# Patient Record
Sex: FEMALE | Race: WHITE | Employment: FULL TIME | URBAN - METROPOLITAN AREA
[De-identification: names, ages, dates, MRNs, and addresses within clinical notes are randomized per-mention and may not be internally consistent; named-entity substitution may affect disease eponyms.]

---

## 2018-09-11 ENCOUNTER — HOSPITAL ENCOUNTER (EMERGENCY)
Facility: CLINIC | Age: 43
Discharge: HOME | End: 2018-09-11
Attending: EMERGENCY MEDICINE | Admitting: EMERGENCY MEDICINE
Payer: COMMERCIAL

## 2018-09-11 VITALS
RESPIRATION RATE: 16 BRPM | DIASTOLIC BLOOD PRESSURE: 78 MMHG | TEMPERATURE: 97.5 F | HEART RATE: 98 BPM | SYSTOLIC BLOOD PRESSURE: 127 MMHG | OXYGEN SATURATION: 97 %

## 2018-09-11 DIAGNOSIS — T63.441A BEE STING REACTION, ACCIDENTAL OR UNINTENTIONAL, INITIAL ENCOUNTER: ICD-10-CM

## 2018-09-11 PROCEDURE — 99282 EMERGENCY DEPT VISIT SF MDM: CPT

## 2018-09-11 RX ORDER — CEPHALEXIN 500 MG/1
500 CAPSULE ORAL 4 TIMES DAILY
Qty: 28 CAPSULE | Refills: 0 | Status: SHIPPED | OUTPATIENT
Start: 2018-09-11 | End: 2018-09-18

## 2018-09-11 ASSESSMENT — ENCOUNTER SYMPTOMS
ROS SKIN COMMENTS: LEFT HAND SWELLING
FEVER: 0
RESPIRATORY NEGATIVE: 1

## 2018-09-11 NOTE — LETTER
September 11, 2018      To Whom It May Concern:      Jennifer Ryan was seen in our Emergency Department today, 09/11/18.  I expect her condition to improve over the next 2 days.  She may return to work/school when improved.    Sincerely,        Dr. Sanches/ Milly RN

## 2018-09-11 NOTE — ED AVS SNAPSHOT
[facility] [place] [facility]    [address]    [facility] [address]    Phone:  [phone]    Fax:  [phone]                                       Jennifer Ryan   MRN: [id number]    Department:  [facility] [place] [facility]   Date of Visit:  9/11/2018           Patient Information     Date Of Birth          1975        Your diagnoses for this visit were:     Bee sting reaction, accidental or unintentional, initial encounter        You were seen by Gigi Sanches MD.      Follow-up Information     Follow up with Dina Presley.    Specialty:  Family Practice    Why:  As needed    Contact information:    [facility]  [address] [address]  [phone]          Follow up with [facility] [place] [facility].    Specialty:  EMERGENCY MEDICINE    Why:  If symptoms worsen    Contact information:    [address]  [facility] [address]  [phone]        Discharge Instructions         You have what appears to be  Local inflammatory reaction secondary to a be sting. This should be treated with ice and benadryl/zyrtec. You will also be discharged with an antibiotic. Only take the antibiotic if it is worse in 3 days.       Insect, Spider, and Scorpion Bites and Stings  Most insect bites are harmless and cause only minor swelling or itching. But if you re allergic to insects such as wasps or bees, a sting can cause a life-threatening allergic reaction. Some ticks can carry and transmit serious diseases. The venom (poison) from scorpions and certain spiders can also be deadly, although this is rare. Knowing when to seek emergency care could save your life.     The black  (top) and brown recluse (bottom) are two poisonous spiders found in the [place].   When to go to the emergency room (ER)    Scorpion sting    Bite from a black, red, or brown  spider or brown recluse spider    Severe pain or swelling  "at the site of bite    A tick that is embedded in your skin and can not be easily removed at home    Signs of an allergic reaction such as:  ? Hives  ? Swelling of your eyes, lips, or the inside of your throat  ? Trouble breathing  ? Dizziness or confusion  What to expect in the ER    If you re having trouble breathing, you ll be given oxygen through a mask. In case of severe breathing difficulty, you may have a tube inserted in your throat and be placed on a ventilator (breathing machine).    If you are having a severe allergic reaction from a sting (called anaphylaxis), you may be given a shot of epinephrine. If it is known that you are allergic to bee or wasp stings, your doctor may give you a prescription for an \"epi-pen\" that you can keep with you at all times in case of a sting.    You may receive antivenin (a substance that reverses the effects of poison) for some spider bites and scorpion stings. Because antivenin can sometimes cause other problems, your doctor will weigh the risks and benefits of this treatment.    Steroids such as prednisone are often used to treat allergic reactions. In many cases, your doctor will also prescribe an antihistamine to help relieve itching.  Easing symptoms of an insect bite or sting    Try to remove a stinger you can see. Use your fingernail, a knife edge, or credit card to scrape against the skin. Do not squeeze or pull.    Apply ice or a cold compress to reduce pain and swelling (keep a thin cloth between the cold source and the skin).   Date Last Reviewed: 12/1/2016 2000-2017 [employer]. [address] [place] [address]. All rights reserved. This information is not intended as a substitute for professional medical care. Always follow your healthcare professional's instructions.            24 Hour Appointment Hotline       To make an appointment at any [facility], call [phone] ([phone]). If you don't have a family doctor or " clinic, we will help you find one. [facility] are conveniently located to serve the needs of you and your family.             Review of your medicines      START taking        Dose / Directions Last dose taken    cephALEXin 500 MG capsule   Commonly known as:  KEFLEX   Dose:  500 mg   Quantity:  28 capsule        Take 1 capsule (500 mg) by mouth 4 times daily for 7 days   Refills:  0          Our records show that you are taking the medicines listed below. If these are incorrect, please call your family doctor or clinic.        Dose / Directions Last dose taken    ADDERALL XR PO   Dose:  20 mg        Take 20 mg by mouth.   Refills:  0        CITALOPRAM HYDROBROMIDE PO        Refills:  0        fluticasone 50 MCG/ACT spray   Commonly known as:  FLONASE   Dose:  2 spray        2 sprays by Both Nostrils route daily.   Refills:  0                Prescriptions were sent or printed at these locations (1 Prescription)                   Other Prescriptions                Printed at Department/Unit printer (1 of 1)         cephALEXin (KEFLEX) 500 MG capsule                Orders Needing Specimen Collection     None      Pending Results     No orders found from 9/9/2018 to 9/12/2018.            Pending Culture Results     No orders found from 9/9/2018 to 9/12/2018.            Pending Results Instructions     If you had any lab results that were not finalized at the time of your Discharge, you can call the ED Lab Result RN at [phone]. You will be contacted by this team for any positive Lab results or changes in treatment. The nurses are available 7 days a week from 10A to 6:30P.  You can leave a message 24 hours per day and they will return your call.        Test Results From Your Hospital Stay               Clinical Quality Measure: Blood Pressure Screening     Your blood pressure was checked while you were in the emergency department today. The last reading we obtained was  BP: 131/75 . Please read the guidelines  "below about what these numbers mean and what you should do about them.  If your systolic blood pressure (the top number) is less than 120 and your diastolic blood pressure (the bottom number) is less than 80, then your blood pressure is normal. There is nothing more that you need to do about it.  If your systolic blood pressure (the top number) is 120-139 or your diastolic blood pressure (the bottom number) is 80-89, your blood pressure may be higher than it should be. You should have your blood pressure rechecked within a year by a primary care provider.  If your systolic blood pressure (the top number) is 140 or greater or your diastolic blood pressure (the bottom number) is 90 or greater, you may have high blood pressure. High blood pressure is treatable, but if left untreated over time it can put you at risk for heart attack, stroke, or kidney failure. You should have your blood pressure rechecked by a primary care provider within the next 4 weeks.  If your provider in the emergency department today gave you specific instructions to follow-up with your doctor or provider even sooner than that, you should follow that instruction and not wait for up to 4 weeks for your follow-up visit.        Thank you for choosing [facility]       Thank you for choosing [facility] for your care. Our goal is always to provide you with excellent care. Hearing back from our patients is one way we can continue to improve our services. Please take a few minutes to complete the written survey that you may receive in the mail after you visit with us. Thank you!        [employer] Information     [employer] lets you send messages to your doctor, view your test results, renew your prescriptions, schedule appointments and more. To sign up, go to [email or web address] . Click on \"Log in\" on the left side of the screen, which will take you to the Welcome page. Then click on \"Sign up Now\" on the right side of the page.     You will be asked to enter " the access code listed below, as well as some personal information. Please follow the directions to create your username and password.     Your access code is: [email or web address]  Expires: 12/10/2018  7:33 AM     Your access code will  in 90 days. If you need help or a new code, please call your [place] clinic or [phone].        Care EveryWhere ID     This is your Care EveryWhere ID. This could be used by other organizations to access your [place] medical records  [id number]        Equal Access to Services     [facility]: Yasir Keith, edwina cadena, fang washingtonalkaterina ibarra, abby santiago . So [facility] [phone].    ATENCIÓN: Si habla español, tiene a yip disposición servicios gratuitos de asistencia lingüística. Llame al [phone].    We comply with applicable federal civil rights laws and [place] laws. We do not discriminate on the basis of race, color, national origin, age, disability, sex, sexual orientation, or gender identity.            After Visit Summary       This is your record. Keep this with you and show to your community pharmacist(s) and doctor(s) at your next visit.

## 2018-09-11 NOTE — ED PROVIDER NOTES
History     Chief Complaint:  Wasp Sting     The history is provided by the patient.      Jennifer Ryan is a 42 year old female with a history of anxiety who presents to the emergency department for evaluation of a wasp sting. Two days ago the patient reports being stung on her left hand by a wasp. Since the sting, she has noted worsening swelling and pain to the touch of the site of the sting. She notes that she has been using benadryl and ice with ibuprofen for the pain to no avail. This morning the patient notes swelling to approximately twice the size prompting her to seek further evaluation of the swelling here in the emergency department. Here, the patient complains of the increased swelling to her left hand as well as pain and itching at the site of the sting. She denies any co-occurrence of fevers, respiratory distress or rashes elsewhere on her body. Of note, the patient's last benadryl was yesterday night and she has no known history of allergies to bee stings.     Allergies:  Doxycycline  Hydrocodone    Medications:    Adderall  Citalopram Hydrobromide  Fluticasone    Past Medical History:    Anxiety  Migraines  Cerebral Artery Occlusion with Cerebral Infarct    Past Surgical History:     Section  ENT Surgery  Septoplasty, Turbinoplasty     Family History:    No past pertinent family history.    Social History:  Marital Status:   [2]  Tobacco Use: No  Alcohol Use: Yes    Review of Systems   Constitutional: Negative for fever.   Respiratory: Negative.    Skin:        Left Hand Swelling   All other systems reviewed and are negative.      Physical Exam     Patient Vitals for the past 24 hrs:   BP Temp Temp src Pulse Resp SpO2   18 0703 131/75 97.5  F (36.4  C) Temporal 111 16 100 %       Physical Exam   Skin: There is erythema.            General: Well-nourished, appears stated age  Eyes: PERRL, conjunctivae pink no scleral icterus or conjunctival injection  ENT:  Moist mucus  membranes, pharynx nonerythematous and without exudate  Respiratory:  No respiratory distress, no wheezes, crackles or rales  CV: Normal rate, no murmurs, rubs or gallops  GI: Nontender, nondistended, normal bowel sounds, no rebound or guarding  : No CVA tenderness  Skin: Warm, dry.  No Hives, rashes or petechiae   Left hand: Mild swelling and erythema in the ulnar aspect of her of hand. CMS and tendon function intact distally.   Musculoskeletal: No peripheral edema or calf tenderness, strength 5 out of 5 throughout  Neuro: GCS 15, alert and oriented to person/place/time, cranial nerves II through XII intact  Psychiatric: Normal affect      Emergency Department Course     Emergency Department Course:  0710 Nursing notes and vitals reviewed. I performed an exam of the patient as documented above.     0729 I rechecked the patient and discussed the results of her workup thus far.     Findings and plan explained to the patient. Patient discharged home with instructions regarding supportive care, medications, and reasons to return. The importance of close follow-up was reviewed.    I personally answered all related questions prior to discharge.     Impression & Plan      Medical Decision Making:    Jennifer Ryan is a 42 year old female who presents for evaluation of rash after likely bee/wasp sting 2 days ago.  Their rash and associated symptoms are most consistent with a local allergic phenomena.  This appears to be at this point an isolated rash without signs of angioedema, respiratory compromise, shock, serious systemic reaction, fever etc.  No signs of serious infection, cellulitis, vasculitis, or other skin manifestation of a serious underlying disease.  Supportive outpatient management is with OTC antihistamines and ice. My clinical suspicion for cellulitis is very low at this time and would not recommend antibiotics at this time. However, She was also discharged with a script for keflex to start ONLY  if the  rash is worse in 3 days. Close followup with primary care physician is recommended.  Return if  wheezing, progressive shortness of breath, facial or throat/tongue swelling.  All of her questions were answered prior to discharge      Diagnosis:    ICD-10-CM    1. Bee sting reaction, accidental or unintentional, initial encounter T63.441A        Disposition:  discharged to home    Discharge Medications:  New Prescriptions    CEPHALEXIN (KEFLEX) 500 MG CAPSULE    Take 1 capsule (500 mg) by mouth 4 times daily for 7 days     Scribe Disclosure:  I, Remington Jenkins, am serving as a scribe on 9/11/2018 at 7:05 AM to personally document services performed by Kasi Jiang PA based on my observations and the provider's statements to me.     Remington Jenkins  9/11/2018   [facility] EMERGENCY DEPARTMENT       Kasi Jiang PA-C  09/11/18 0757

## 2018-09-11 NOTE — DISCHARGE INSTRUCTIONS
"  You have what appears to be  Local inflammatory reaction secondary to a be sting. This should be treated with ice and benadryl/zyrtec. You will also be discharged with an antibiotic. Only take the antibiotic if it is worse in 3 days.       Insect, Spider, and Scorpion Bites and Stings  Most insect bites are harmless and cause only minor swelling or itching. But if you re allergic to insects such as wasps or bees, a sting can cause a life-threatening allergic reaction. Some ticks can carry and transmit serious diseases. The venom (poison) from scorpions and certain spiders can also be deadly, although this is rare. Knowing when to seek emergency care could save your life.     The black  (top) and brown recluse (bottom) are two poisonous spiders found in the [place].   When to go to the emergency room (ER)    Scorpion sting    Bite from a black, red, or brown  spider or brown recluse spider    Severe pain or swelling at the site of bite    A tick that is embedded in your skin and can not be easily removed at home    Signs of an allergic reaction such as:  ? Hives  ? Swelling of your eyes, lips, or the inside of your throat  ? Trouble breathing  ? Dizziness or confusion  What to expect in the ER    If you re having trouble breathing, you ll be given oxygen through a mask. In case of severe breathing difficulty, you may have a tube inserted in your throat and be placed on a ventilator (breathing machine).    If you are having a severe allergic reaction from a sting (called anaphylaxis), you may be given a shot of epinephrine. If it is known that you are allergic to bee or wasp stings, your doctor may give you a prescription for an \"epi-pen\" that you can keep with you at all times in case of a sting.    You may receive antivenin (a substance that reverses the effects of poison) for some spider bites and scorpion stings. Because antivenin can sometimes cause other problems, your doctor will weigh the " risks and benefits of this treatment.    Steroids such as prednisone are often used to treat allergic reactions. In many cases, your doctor will also prescribe an antihistamine to help relieve itching.  Easing symptoms of an insect bite or sting    Try to remove a stinger you can see. Use your fingernail, a knife edge, or credit card to scrape against the skin. Do not squeeze or pull.    Apply ice or a cold compress to reduce pain and swelling (keep a thin cloth between the cold source and the skin).   Date Last Reviewed: 12/1/2016 2000-2017 [employer]. [address] [place] [address], [place] [address]. All rights reserved. This information is not intended as a substitute for professional medical care. Always follow your healthcare professional's instructions.

## 2018-09-11 NOTE — ED AVS SNAPSHOT
[facility]    [address]    [facility] [address]    Phone:  [phone]    Fax:  [phone]                                       Jennifer Ryan   MRN: [id number]    Department:  [facility]   Date of Visit:  9/11/2018           After Visit Summary Signature Page     I have received my discharge instructions, and my questions have been answered. I have discussed any challenges I see with this plan with the nurse or doctor.    ..........................................................................................................................................  Patient/Patient Representative Signature      ..........................................................................................................................................  Patient Representative Print Name and Relationship to Patient    ..................................................               ................................................  Date                                            Time    ..........................................................................................................................................  Reviewed by Signature/Title    ...................................................              ..............................................  Date                                                            Time          22EPIC Rev 08/18

## 2018-09-11 NOTE — ED TRIAGE NOTES
Pt stung by wasp last Sunday and now left hand twice the size and red/swollen.    Pt A&O x 3, CMS x 3, ABCD's adequate in triage